# Patient Record
Sex: MALE | Race: WHITE | NOT HISPANIC OR LATINO | URBAN - METROPOLITAN AREA
[De-identification: names, ages, dates, MRNs, and addresses within clinical notes are randomized per-mention and may not be internally consistent; named-entity substitution may affect disease eponyms.]

---
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
## 2023-05-12 ENCOUNTER — EMERGENCY (EMERGENCY)
Facility: HOSPITAL | Age: 23
LOS: 0 days | Discharge: HOME | End: 2023-05-12
Attending: EMERGENCY MEDICINE
Payer: MEDICAID

## 2023-05-12 VITALS
HEIGHT: 69 IN | DIASTOLIC BLOOD PRESSURE: 79 MMHG | RESPIRATION RATE: 17 BRPM | WEIGHT: 182.98 LBS | HEART RATE: 73 BPM | TEMPERATURE: 96 F | OXYGEN SATURATION: 100 % | SYSTOLIC BLOOD PRESSURE: 134 MMHG

## 2023-05-12 DIAGNOSIS — S61.511A LACERATION WITHOUT FOREIGN BODY OF RIGHT WRIST, INITIAL ENCOUNTER: ICD-10-CM
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
## 2023-05-12 DIAGNOSIS — S60.511A ABRASION OF RIGHT HAND, INITIAL ENCOUNTER: ICD-10-CM

## 2023-05-12 PROCEDURE — 99282 EMERGENCY DEPT VISIT SF MDM: CPT

## 2023-05-12 PROCEDURE — 99283 EMERGENCY DEPT VISIT LOW MDM: CPT

## 2023-05-12 NOTE — ED PROVIDER NOTE - NS ED ATTENDING STATEMENT MOD
This was a shared visit with the TINY. I reviewed and verified the documentation and independently performed the documented:

## 2023-05-12 NOTE — ED PROVIDER NOTE - PATIENT PORTAL LINK FT
You can access the FollowMyHealth Patient Portal offered by [facility] by registering at the following website: [email or web address]. By joining [employer]’s FollowMyHealth portal, you will also be able to view your health information using other applications (apps) compatible with our system.
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
## 2023-05-12 NOTE — ED ADULT NURSE NOTE - NSFALLUNIVINTERV_ED_ALL_ED
Bed/Stretcher in lowest position, wheels locked, appropriate side rails in place/Call bell, personal items and telephone in reach/Instruct patient to call for assistance before getting out of bed/chair/stretcher/Non-slip footwear applied when patient is off stretcher/[place] to call system/Physically safe environment - no spills, clutter or unnecessary equipment/Purposeful proactive rounding/Room/bathroom lighting operational, light cord in reach
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
## 2023-05-12 NOTE — ED PROVIDER NOTE - PHYSICAL EXAMINATION
GENERAL: Well-nourished, Well-developed. NAD.  HEAD: No visible or palpable bumps or hematomas. No ecchymosis behind ears B/L.  ENMT: MMM.   Neck: Supple. FROM  CVS: RRR  MSK: FROM R hand, no bony ttp.   Skin: (+)superficial abrasions to 2nd-4th fingers overlying PIP. small superficial glass particles  Neuro: NVI

## 2023-07-30 ENCOUNTER — EMERGENCY (EMERGENCY)
Facility: HOSPITAL | Age: 23
LOS: 0 days | Discharge: HOME | End: 2023-07-30
Attending: STUDENT IN AN ORGANIZED HEALTH CARE EDUCATION/TRAINING PROGRAM
Payer: MEDICAID

## 2023-07-30 VITALS
HEART RATE: 78 BPM | HEIGHT: 69 IN | RESPIRATION RATE: 18 BRPM | WEIGHT: 182.98 LBS | DIASTOLIC BLOOD PRESSURE: 73 MMHG | TEMPERATURE: 99 F | SYSTOLIC BLOOD PRESSURE: 131 MMHG | OXYGEN SATURATION: 99 %

## 2023-07-30 DIAGNOSIS — S61.022A LACERATION WITH FOREIGN BODY OF LEFT THUMB WITHOUT DAMAGE TO NAIL, INITIAL ENCOUNTER: ICD-10-CM

## 2023-07-30 DIAGNOSIS — Z23 ENCOUNTER FOR IMMUNIZATION: ICD-10-CM
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
## 2023-07-30 PROCEDURE — 99284 EMERGENCY DEPT VISIT MOD MDM: CPT | Mod: 25

## 2023-07-30 PROCEDURE — 12041 INTMD RPR N-HF/GENIT 2.5CM/<: CPT

## 2023-07-30 PROCEDURE — 99283 EMERGENCY DEPT VISIT LOW MDM: CPT | Mod: 25

## 2023-07-30 PROCEDURE — 90471 IMMUNIZATION ADMIN: CPT

## 2023-07-30 PROCEDURE — 90715 TDAP VACCINE 7 YRS/> IM: CPT

## 2023-07-30 RX ORDER — TETANUS TOXOID, REDUCED DIPHTHERIA TOXOID AND ACELLULAR PERTUSSIS VACCINE, ADSORBED 5; 2.5; 8; 8; 2.5 [IU]/.5ML; [IU]/.5ML; UG/.5ML; UG/.5ML; UG/.5ML
0.5 SUSPENSION INTRAMUSCULAR ONCE
Refills: 0 | Status: COMPLETED | OUTPATIENT
Start: 2023-07-30 | End: 2023-07-30

## 2023-07-30 RX ADMIN — TETANUS TOXOID, REDUCED DIPHTHERIA TOXOID AND ACELLULAR PERTUSSIS VACCINE, ADSORBED 0.5 MILLILITER(S): 5; 2.5; 8; 8; 2.5 SUSPENSION INTRAMUSCULAR at 21:03

## 2023-07-30 NOTE — ED PROVIDER NOTE - CARE PROVIDER_API CALL
Bri Enciso  Pediatrics  [address] [place] [address]  Phone: [phone]  Fax: [phone]  Follow Up Time:

## 2023-07-30 NOTE — ED PROVIDER NOTE - NSFOLLOWUPINSTRUCTIONS_ED_ALL_ED_FT
Finger Laceration    WHAT YOU NEED TO KNOW:    A finger laceration is a deep cut in your skin. It is often caused by a sharp object, such as a knife, or blunt force to your finger. Your blood vessels, bones, joints, tendons, or nerves may also be injured.     DISCHARGE INSTRUCTIONS:    Return to the emergency department if:     Your wound comes apart.       Blood soaks through your bandage.      You have severe pain in your finger or hand.       Your finger is pale and cold.       You have sudden trouble moving your finger.       Your swelling suddenly gets worse.       You have red streaks on your skin coming from your wound.     Contact your healthcare provider or hand specialist if:     You have new numbness or tingling.       Your finger feels warm, looks swollen or red, and is draining pus.       You have a fever.       You have questions or concerns about your condition or care.     Medicines: You may need any of the following:     Antibiotics help prevent a bacterial infection.       Acetaminophen decreases pain and fever. It is available without a doctor's order. Ask how much to take and how often to take it. Follow directions. Read the labels of all other medicines you are using to see if they also contain acetaminophen, or ask your doctor or pharmacist. Acetaminophen can cause liver damage if not taken correctly. Do not use more than 4 grams (4,000 milligrams) total of acetaminophen in one day.       Prescription pain medicine may be given. Ask your healthcare provider how to take this medicine safely. Some prescription pain medicines contain acetaminophen. Do not take other medicines that contain acetaminophen without talking to your healthcare provider. Too much acetaminophen may cause liver damage. Prescription pain medicine may cause constipation. Ask your healthcare provider how to prevent or treat constipation.       Take your medicine as directed. Contact your healthcare provider if you think your medicine is not helping or if you have side effects. Tell him or her if you are allergic to any medicine. Keep a list of the medicines, vitamins, and herbs you take. Include the amounts, and when and why you take them. Bring the list or the pill bottles to follow-up visits. Carry your medicine list with you in case of an emergency.    Self-care:     Apply ice on your finger for 15 to 20 minutes every hour or as directed. Use an ice pack, or put crushed ice in a plastic bag. Cover it with a towel before you apply it to your skin. Ice helps prevent tissue damage and decreases swelling and pain.      Elevate your hand above the level of your heart as often as you can. This will help decrease swelling and pain. Prop your hand on pillows or blankets to keep it elevated comfortably.       Wear your splint as directed. A splint will decrease movement and stress on your wound. The splint may help your wound heal faster. Ask your healthcare provider how to apply and remove a splint.       Apply ointments to decrease scarring. Do not apply ointments until your healthcare provider says it is okay. You may need to wait until your wound is healed. Ask which ointment to buy and how often to use it.     Wound care:     Do not get your wound wet until your healthcare provider says it is okay. Do not soak your hand in water. Do not go swimming until your healthcare provider says it is okay. When your healthcare provider says it is okay, carefully wash around the wound with soap and water. Let soap and water run over your wound. Gently pat the area dry or allow it to air dry.       Change your bandages when they get wet, dirty, or after washing. Apply new, clean bandages as directed. Do not apply elastic bandages or tape too tightly. Do not put powders or lotions on your wound.       Apply antibiotic ointment as directed. Your healthcare provider may give you antibiotic ointment to put over your wound if you have stitches. If you have Strips-Strips™ over your wound, let them dry up and fall off on their own. If they do not fall off within 14 days, gently remove them. If you have glue over your wound, do not remove or pick at it. If your glue comes off, do not replace it with glue that you have at home.       Check your wound every day for signs of infection. Signs of infection include swelling, redness, or pus.     Follow up with your healthcare provider or hand specialist in 2 days: Write down your questions so you remember to ask them during your visits.        © Copyright [employer] 2019 All illustrations and images included in CareNotes are the copyrighted property of [employer] or [employer].

## 2023-07-30 NOTE — ED PROVIDER NOTE - PHYSICAL EXAMINATION
CONST: Well appearing in NAD  MS: Normal ROM in all extremities. No midline spinal tenderness.  SKIN: 1.5 cm laceration to L thumb pad. L Thumb ROM in tact. No tendon injury. NV intact. Cap refil < 2 sec.   NEURO: A&Ox3, No focal deficits.

## 2023-07-30 NOTE — ED PROVIDER NOTE - CLINICAL SUMMARY MEDICAL DECISION MAKING FREE TEXT BOX
Laceration explored, irrigated and repaired as per procedure note.  Home care and f/u instructions for removal discussed as well as return precautions (increasing pain, redness, pus, swelling, etc)

## 2023-07-30 NOTE — ED PROVIDER NOTE - ATTENDING CONTRIBUTION TO CARE
24 yo m no pmh  pt presents for L thumb pad lac. pt was cutting food and cut tip of L thumb pad. no other injury. unsure tdap.    vss  gen- NAD, aaox3  card-rrr  lungs-ctab, no wheezing or rhonchi  neuro- full str/sensation, cn ii-xii grossly intact, normal coordination and gait  skin- superficial laceration to distal pad ~1.5cm, not crossing joint, FROM and str to DIP/MCP, sensation intact, cap refill <2 sec

## 2023-07-30 NOTE — ED PROVIDER NOTE - PATIENT PORTAL LINK FT
You can access the FollowMyHealth Patient Portal offered by [facility] by registering at the following website: [email or web address]. By joining [facility]’s FollowMyHealth portal, you will also be able to view your health information using other applications (apps) compatible with our system.

## 2023-07-30 NOTE — ED PROVIDER NOTE - OBJECTIVE STATEMENT
22 yo R hand dominant male presents complaining of laceration to L thumb. Patient was cutting frozen burgers, knife slipped and cut him. Hemostasis achieved with direct pressure. Tetanus uptodate. No other injuries

## 2023-07-30 NOTE — ED PROVIDER NOTE - NPI NUMBER (FOR SYSADMIN USE ONLY) :
Ramon Gaspar,   Just wanted to let you know that your wet mount already came back and is showing both BV and yeast this time, which would definitely explain your discomfort! I will go ahead and write you for the metronidazole again for 7 days for BV along with fluconazole for 2 doses for the yeast. Just a heads up again that the metronidazole does not mix well with alcohol so try to avoid when taking. Please let me know if you have any further questions at all, and feel better soon!    Have a nice weekend,     Dr. Funes [[id number]]

## 2023-08-07 ENCOUNTER — EMERGENCY (EMERGENCY)
Facility: HOSPITAL | Age: 23
LOS: 0 days | Discharge: LEFT BEFORE DISCHARGE | End: 2023-08-07
Attending: EMERGENCY MEDICINE
Payer: MEDICAID

## 2023-08-07 VITALS
DIASTOLIC BLOOD PRESSURE: 60 MMHG | SYSTOLIC BLOOD PRESSURE: 126 MMHG | RESPIRATION RATE: 18 BRPM | HEIGHT: 69 IN | OXYGEN SATURATION: 100 % | HEART RATE: 58 BPM | TEMPERATURE: 98 F

## 2023-08-07 PROCEDURE — L9991: CPT

## 2023-08-08 ENCOUNTER — EMERGENCY (EMERGENCY)
Facility: HOSPITAL | Age: 23
LOS: 0 days | Discharge: HOME | End: 2023-08-08
Attending: EMERGENCY MEDICINE
Payer: MEDICAID

## 2023-08-08 VITALS
DIASTOLIC BLOOD PRESSURE: 55 MMHG | RESPIRATION RATE: 18 BRPM | TEMPERATURE: 98 F | OXYGEN SATURATION: 99 % | WEIGHT: 190.04 LBS | SYSTOLIC BLOOD PRESSURE: 122 MMHG | HEART RATE: 84 BPM | HEIGHT: 69 IN

## 2023-08-08 DIAGNOSIS — S61.012D LACERATION WITHOUT FOREIGN BODY OF LEFT THUMB WITHOUT DAMAGE TO NAIL, SUBSEQUENT ENCOUNTER: ICD-10-CM

## 2023-08-08 PROCEDURE — 99212 OFFICE O/P EST SF 10 MIN: CPT

## 2023-08-08 PROCEDURE — L9995: CPT

## 2023-08-08 NOTE — ED PROVIDER NOTE - PHYSICAL EXAMINATION
Vital Signs: I have reviewed the initial vital signs.  Constitutional: well-nourished, no acute distress  Musculoskeletal: good ROM of extremity,  no bony tenderness, no deformity, good peripheral pulses  Integumentary: (+)sutures well healed left thumb - no erythema, drainage or tenderness   Neurologic: awake, alert, extremities’ motor and sensory functions grossly intact, no focal deficits  heme: (-) no adenopathy (-)lymphangitis

## 2023-08-08 NOTE — ED PROVIDER NOTE - OBJECTIVE STATEMENT
24 y/o male presents to the ED for suture removal to left thumb. sutures placed over a week ago. no swelling or redness surrounding wound. no streaking up hand. no drainage from wound.

## 2023-08-08 NOTE — ED PROVIDER NOTE - ATTENDING APP SHARED VISIT CONTRIBUTION OF CARE
23yM presents for removal of sutures to his L thumb.  Wound has healed well w/o bleeding, dehiscence or infection.

## 2023-08-08 NOTE — ED PROVIDER NOTE - PATIENT PORTAL LINK FT
You can access the FollowMyHealth Patient Portal offered by [facility] by registering at the following website: [email or web address]. By joining [facility]’s FollowMyHealth portal, you will also be able to view your health information using other applications (apps) compatible with our system.

## 2023-08-08 NOTE — ED PROVIDER NOTE - CLINICAL SUMMARY MEDICAL DECISION MAKING FREE TEXT BOX
23yM presents for removal of L thumb sutures.  Pt well appearing w/o bleeding, dehiscence or infections.  Recommend supportive care, o/p f/u as needed, return precautions.

## 2023-08-09 DIAGNOSIS — S61.012D LACERATION WITHOUT FOREIGN BODY OF LEFT THUMB WITHOUT DAMAGE TO NAIL, SUBSEQUENT ENCOUNTER: ICD-10-CM

## 2023-08-09 DIAGNOSIS — Z53.21 PROCEDURE AND TREATMENT NOT CARRIED OUT DUE TO PATIENT LEAVING PRIOR TO BEING SEEN BY HEALTH CARE PROVIDER: ICD-10-CM
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
## 2023-08-09 DIAGNOSIS — Z48.02 ENCOUNTER FOR REMOVAL OF SUTURES: ICD-10-CM

## 2023-11-17 NOTE — ED ADULT NURSE NOTE - SUICIDE SCREENING QUESTION 2
normal appearance , without tenderness upon palpation , no deformities , trachea midline, no masses , no JVD.
No

## 2023-12-06 NOTE — ED PEDIATRIC NURSE NOTE - OBJECTIVE STATEMENT
I reviewed the H&P, I examined the patient, and there are no changes in the patient's condition.     pt was in the sun today and fell asleep. now with sunburn & headache

## 2024-08-21 NOTE — ED ADULT TRIAGE NOTE - AS TEMP SITE
Anesthesia Transfer of Care Note    Patient: Yenifer Chatterjee    Procedure(s) Performed: Procedure(s) (LRB):  EGD (ESOPHAGOGASTRODUODENOSCOPY) (N/A)    Patient location: PACU    Anesthesia Type: general    Transport from OR: Transported from OR on room air with adequate spontaneous ventilation    Post pain: adequate analgesia    Post assessment: no apparent anesthetic complications and tolerated procedure well    Post vital signs: stable    Level of consciousness: sedated and responds to stimulation    Nausea/Vomiting: no nausea/vomiting    Complications: none    Transfer of care protocol was followed      Last vitals: Visit Vitals  /76 (BP Location: Left arm, Patient Position: Lying)   Pulse 65   Temp 36.9 °C (98.4 °F) (Skin)   Resp 16   Wt 93.4 kg (206 lb)   SpO2 99%   Breastfeeding No   BMI 35.36 kg/m²     
oral

## 2024-11-18 ENCOUNTER — EMERGENCY (EMERGENCY)
Facility: HOSPITAL | Age: 24
LOS: 0 days | Discharge: HOME | End: 2024-11-18
Attending: EMERGENCY MEDICINE
Payer: COMMERCIAL

## 2024-11-18 VITALS
OXYGEN SATURATION: 99 % | SYSTOLIC BLOOD PRESSURE: 121 MMHG | WEIGHT: 179.9 LBS | HEART RATE: 77 BPM | TEMPERATURE: 98 F | DIASTOLIC BLOOD PRESSURE: 74 MMHG | RESPIRATION RATE: 20 BRPM

## 2024-11-18 DIAGNOSIS — S60.413A ABRASION OF LEFT MIDDLE FINGER, INITIAL ENCOUNTER: ICD-10-CM
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
## 2024-11-18 DIAGNOSIS — S60.417A ABRASION OF LEFT LITTLE FINGER, INITIAL ENCOUNTER: ICD-10-CM

## 2024-11-18 DIAGNOSIS — Z23 ENCOUNTER FOR IMMUNIZATION: ICD-10-CM
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
## 2024-11-18 PROCEDURE — 90471 IMMUNIZATION ADMIN: CPT

## 2024-11-18 PROCEDURE — 90715 TDAP VACCINE 7 YRS/> IM: CPT

## 2024-11-18 PROCEDURE — 99283 EMERGENCY DEPT VISIT LOW MDM: CPT | Mod: 25

## 2024-11-18 PROCEDURE — 99284 EMERGENCY DEPT VISIT MOD MDM: CPT

## 2024-11-18 RX ORDER — AMOXICILLIN AND CLAVULANATE POTASSIUM 600; 42.9 MG/5ML; MG/5ML
1 POWDER, FOR SUSPENSION ORAL ONCE
Refills: 0 | Status: COMPLETED | OUTPATIENT
Start: 2024-11-18 | End: 2024-11-18

## 2024-11-18 RX ORDER — CLOSTRIDIUM TETANI TOXOID ANTIGEN (FORMALDEHYDE INACTIVATED), CORYNEBACTERIUM DIPHTHERIAE TOXOID ANTIGEN (FORMALDEHYDE INACTIVATED), BORDETELLA PERTUSSIS TOXOID ANTIGEN (GLUTARALDEHYDE INACTIVATED), BORDETELLA PERTUSSIS FILAMENTOUS HEMAGGLUTININ ANTIGEN (FORMALDEHYDE INACTIVATED), BORDETELLA PERTUSSIS PERTACTIN ANTIGEN, AND BORDETELLA PERTUSSIS FIMBRIAE 2/3 ANTIGEN 5; 2; 2.5; 5; 3; 5 [LF]/.5ML; [LF]/.5ML; UG/.5ML; UG/.5ML; UG/.5ML; UG/.5ML
0.5 INJECTION, SUSPENSION INTRAMUSCULAR ONCE
Refills: 0 | Status: COMPLETED | OUTPATIENT
Start: 2024-11-18 | End: 2024-11-18

## 2024-11-18 RX ORDER — AMOXICILLIN AND CLAVULANATE POTASSIUM 600; 42.9 MG/5ML; MG/5ML
1 POWDER, FOR SUSPENSION ORAL
Qty: 14 | Refills: 0
Start: 2024-11-18 | End: 2024-11-24

## 2024-11-18 RX ADMIN — AMOXICILLIN AND CLAVULANATE POTASSIUM 1 TABLET(S): 600; 42.9 POWDER, FOR SUSPENSION ORAL at 12:35

## 2024-11-18 RX ADMIN — CLOSTRIDIUM TETANI TOXOID ANTIGEN (FORMALDEHYDE INACTIVATED), CORYNEBACTERIUM DIPHTHERIAE TOXOID ANTIGEN (FORMALDEHYDE INACTIVATED), BORDETELLA PERTUSSIS TOXOID ANTIGEN (GLUTARALDEHYDE INACTIVATED), BORDETELLA PERTUSSIS FILAMENTOUS HEMAGGLUTININ ANTIGEN (FORMALDEHYDE INACTIVATED), BORDETELLA PERTUSSIS PERTACTIN ANTIGEN, AND BORDETELLA PERTUSSIS FIMBRIAE 2/3 ANTIGEN 0.5 MILLILITER(S): 5; 2; 2.5; 5; 3; 5 INJECTION, SUSPENSION INTRAMUSCULAR at 12:35

## 2024-11-18 NOTE — ED PROVIDER NOTE - CARE PLAN
Inpatient Rehabilitation - Speech Language Pathology Treatment Note    [facility]     Patient Name: Louise GARCIA  : 1962  MRN: [id number]    Today's Date: 2023                   Admit Date: 3/17/2023       Visit Dx:      ICD-10-CM ICD-9-CM   1. Impaired functional mobility, balance, gait, and endurance  Z74.09 V49.89       Patient Active Problem List   Diagnosis   • Sepsis   • Neck pain, chronic   • Upper back pain   • Paresthesia   • Cervical myelopathy   • Lower extremity weakness   • History of blood clots   • Chronic anticoagulation   • Seizures   • Anemia   • GERD (gastroesophageal reflux disease)   • Guillain-[place] syndrome   • Situational mixed anxiety and depressive disorder   • Mass of middle lobe of right lung   • Oral candidiasis   • Empyema of pleura   • MRSA bacteremia   • Idiopathic peripheral neuropathy       Past Medical History:   Diagnosis Date   • Degenerative disc disease, cervical    • Gestational diabetes    • H/O blood clots    • Hematemesis    • Seizures    • Septic shock        Past Surgical History:   Procedure Laterality Date   • APPENDECTOMY     • BACK SURGERY     • CHOLECYSTECTOMY     • ENDOSCOPY N/A 2016    Procedure: ESOPHAGOGASTRODUODENOSCOPY with biopsy;  Surgeon: Austen Brito MD;  Location: [facility] ENDOSCOPY;  Service:    • HYSTERECTOMY     • NECK SURGERY       approx 6 yrs ago   • THORACOSCOPY Right 3/8/2023    Procedure: THORACOSCOPY WITH DAVINCI ROBOT WITH COMPLETE DECORTICATION;  Surgeon: Chloe Cedillo MD;  Location: [facility] OR;  Service: Robotics - DaVinci;  Laterality: Right;   • TONSILLECTOMY     • TONSILLECTOMY AND ADENOIDECTOMY         SLP Recommendation and Plan                                                            SLP EVALUATION (last 72 hours)     SLP SLC Evaluation     Row Name 23 0930 23 1500 23 0930 04/15/23 1230          Communication Assessment/Intervention    Document Type therapy note (daily note)  -AL  "therapy note (daily note)  -AL therapy note (daily note)  -AL therapy note (daily note)  -HF     Subjective Information -- -- -- no complaints  -HF     Patient Observations -- -- -- alert;cooperative;agree to therapy  -HF     Patient/Family/Caregiver Comments/Observations Pt participated well. She reported her  was upset with her last evening, which kept her from sleeping. She reported \"He was yelling and cussing.\"  -AL Pt participated well.  -AL Pt is pleasant and cooperative.  -AL --     Patient Effort -- -- -- good  -HF     Symptoms Noted During/After Treatment -- -- -- none  -HF        Pain Scale: Numbers Pre/Post-Treatment    Pretreatment Pain Rating 0/10 - no pain  -AL 0/10 - no pain  -AL 0/10 - no pain  -AL --        Phonation Goal 1 (SLP)    Comment (Phonation Goal 1, SLP) -- -- -- Introduced to straw phonation exercises. She required initial MAX cues to executive, faded to MIN-MOD over course of session. Encouraged continued completion as part of home exercises to promote forward resonance.  -HF        Memory Skills Goal 1 (SLP)    Comment (Memory Skills Goal 1, SLP) -- -- -- The patient was 89% accurate for 1-back, but unable to complete 2-back due to difficulty with attention/memory.  -HF           User Key  (r) = Recorded By, (t) = Taken By, (c) = Cosigned By    Initials Name Effective Dates    FLORENTINO Spangler Nessa Nguyen, MS CCC-SLP 06/16/21 -     HF Kacey Song CCC-SLP 12/27/22 -                    EDUCATION    The patient has been educated in the following areas:       Cognitive Impairment Communication Impairment.             SLP GOALS     Row Name 04/18/23 1200 04/17/23 1500 04/17/23 0930       Respiratory Support Goal 1 (SLP)    Comment (Respiratory Support Goal 1, SLP) -- Pt required overall MOD cues for diaphragmatic breathing during voice exercises.  -AL --       Phonation Goal 1 (SLP)    Improve Phonation By Goal 1 (SLP) -- using appropriate tone focus;90%;with minimal cues (75-90%)  -AL --    " Time Frame (Phonation Goal 1, SLP) -- short term goal (STG)  -AL --    Progress/Outcomes (Phonation Goal 1, SLP) goal ongoing  -AL good progress toward goal  -AL --    Comment (Phonation Goal 1, SLP) Pt presented with mild-moderate hoarse vocal quality today. She participated in frontal tone focus exercises (humming, initial /m/ CV productions and single syllable initial /m/ words). Pt exhibited vocal fatigue during single syllable productions and had difficulty  maintaining adequate vocal quality. Pt participated in 10 minute conversation with periods of intermittent clear vocal quality.  -AL Pt presented with mild-moderate hoarse vocal quality at beginning of session. Participated in humming, initial /m/ CV chanting and initial /m/ single syllable word production with overall MIN-MOD cues to achieve adequate vocal quality. Pt read a multiple paragraph story with initial MOD cues for frontal tone focus; however, as task progressed, pt required NO cues for maintaining adequate vocal quality. Pt required MIN cues in conversation at end of session to maintain adequate vocal quality. Also, benefited from periodic yawn/sign to assist with reducing hyperfunction.  -AL --       Attention Goal 1 (SLP)    Improve Attention by Goal 1 (SLP) -- -- complete sustained attention task;80%;with minimal cues (75-90%)  -AL    Time Frame (Attention Goal 1, SLP) -- -- 1 week  -AL    Progress/Outcomes (Attention Goal 1, SLP) -- -- continuing progress toward goal  -AL    Comment (Attention Goal 1, SLP) -- -- Acrostics task: 7/12 with NO cues, 12/ with MIN cues  -AL       Memory Skills Goal 1 (SLP)    Improve Memory Skills Through Goal 1 (SLP) -- -- use memory strategies;use external memory aid;recall details of the day;80%;with moderate cues (50-74%)  -AL    Time Frame (Memory Skills Goal 1, SLP) -- -- 1 week  -AL    Progress/Outcomes (Memory Skills Goal 1, SLP) -- -- continuing progress toward goal  -AL    Comment (Memory Skills Goal 1,  SLP) -- -- Recalled 3/3 errands after 15 minutes with NO Cues.  -AL       Organizational Skills Goal 1 (SLP)    Improve Thought Organization Through Goal 1 (SLP) -- -- completing a divergent naming task;80%;with minimal cues (75-90%)  -AL    Time Frame (Thought Organization Skills Goal 1, SLP) -- -- 1 week  -AL    Progress/Outcomes (Thought Organization Skills Goal 1, SLP) -- -- goal ongoing  -AL    Comment (Thought Organization Skills Goal 1, SLP) -- -- Round: 11 with NO cues. Tall: 6 with NO cues  -AL    Row Name 04/15/23 1230             Phonation Goal 1 (SLP)    Comment (Phonation Goal 1, SLP) Introduced to straw phonation exercises. She required initial MAX cues to executive, faded to MIN-MOD over course of session. Encouraged continued completion as part of home exercises to promote forward resonance.  -HF         Memory Skills Goal 1 (SLP)    Comment (Memory Skills Goal 1, SLP) The patient was 89% accurate for 1-back, but unable to complete 2-back due to difficulty with attention/memory.  -HF            User Key  (r) = Recorded By, (t) = Taken By, (c) = Cosigned By    Initials Name Provider Type    Nessa Kraus MS CCC-SLP Speech and Language Pathologist    HF Kacey Song CCC-SLP Speech and Language Pathologist                            Time Calculation:        Time Calculation- SLP     Row Name 04/18/23 1228             Time Calculation- SLP    SLP Start Time 0930  -AL      SLP Stop Time 1000  -AL      SLP Time Calculation (min) 30 min  -AL            User Key  (r) = Recorded By, (t) = Taken By, (c) = Cosigned By    Initials Name Provider Type    Nessa Kraus MS CCC-SLP Speech and Language Pathologist                  Therapy Charges for Today     Code Description Service Date Service Provider Modifiers Qty    56243667878 HC ST DEV OF COGN SKILLS INITIAL 15 MIN 4/17/2023 Nessa Spangler MS CCC-SLP  1    67092369481 HC ST DEV OF COGN SKILLS EACH ADDT'L 15 MIN 4/17/2023 Nessa Spangler  MS CCC-SLP  1    17086549285 HC ST DEV OF COGN SKILLS EACH ADDT'L 15 MIN 4/17/2023 Nessa Spangler, MS CCC-SLP  2    97037119300 HC ST DEV OF COGN SKILLS INITIAL 15 MIN 4/18/2023 Nessa Spangler, MS CCC-SLP  1    36525510148 HC ST DEV OF COGN SKILLS EACH ADDT'L 15 MIN 4/18/2023 Nessa Spangler, MS CCC-SLP  1                           Nessa Spangler, MS CCC-SLP  4/18/2023     Principal Discharge DX:	Dog bite   1

## 2024-11-18 NOTE — ED PROVIDER NOTE - CLINICAL SUMMARY MEDICAL DECISION MAKING FREE TEXT BOX
pt presenting for eval of dog bite to r hand. no other injuries or acute complaints. bitten by his own dog, vaccines utd. no numbness/focal weakness. 2+ equal pulses throughout <2sec capillary refill throughout abrasions to R hand. no erythema, edema, warmth, crepitus, induration, fluctuance, streaking. Comfortable with discharge and follow-up outpatient, strict return precautions given. Endorses understanding of all of this and aware that they can return at any time for new or concerning symptoms. No further questions or concerns at this time

## 2024-11-18 NOTE — ED PROVIDER NOTE - PATIENT PORTAL LINK FT
You can access the FollowMyHealth Patient Portal offered by [facility] by registering at the following website: [email or web address]. By joining [facility]’s FollowMyHealth portal, you will also be able to view your health information using other applications (apps) compatible with our system.

## 2024-11-18 NOTE — ED ADULT TRIAGE NOTE - CHIEF COMPLAINT QUOTE
dog bite to the right hand yesterday . pt. states another dog was attacking his dog and he tried to stop it

## 2024-11-18 NOTE — ED PROVIDER NOTE - OBJECTIVE STATEMENT
24-year-old male with no past medical history presents to the ED status post walking dog at park yesterday and another dog began fighting.  Patient states put his hands in his dog's mouth to release the grasp and got bit to his right hand fourth and fifth digit.  Patient states his dog is up-to-date with shots.  Unknown last tetanus shot.

## 2024-12-09 ENCOUNTER — EMERGENCY (EMERGENCY)
Facility: HOSPITAL | Age: 24
LOS: 0 days | Discharge: HOME | End: 2024-12-09
Attending: EMERGENCY MEDICINE
Payer: COMMERCIAL

## 2024-12-09 VITALS
DIASTOLIC BLOOD PRESSURE: 79 MMHG | OXYGEN SATURATION: 99 % | WEIGHT: 179.9 LBS | HEART RATE: 80 BPM | RESPIRATION RATE: 20 BRPM | HEIGHT: 69 IN | TEMPERATURE: 98 F | SYSTOLIC BLOOD PRESSURE: 146 MMHG
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
## 2024-12-09 DIAGNOSIS — S61.412A LACERATION WITHOUT FOREIGN BODY OF LEFT HAND, INITIAL ENCOUNTER: ICD-10-CM
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
## 2024-12-09 PROCEDURE — 12001 RPR S/N/AX/GEN/TRNK 2.5CM/<: CPT

## 2024-12-09 PROCEDURE — 99284 EMERGENCY DEPT VISIT MOD MDM: CPT | Mod: 25

## 2024-12-09 PROCEDURE — 99283 EMERGENCY DEPT VISIT LOW MDM: CPT | Mod: 25

## 2024-12-09 RX ORDER — CEPHALEXIN 500 MG
500 CAPSULE ORAL ONCE
Refills: 0 | Status: COMPLETED | OUTPATIENT
Start: 2024-12-09 | End: 2024-12-09

## 2024-12-09 RX ORDER — LIDOCAINE HCL 20 MG/ML
10 VIAL (ML) INJECTION ONCE
Refills: 0 | Status: COMPLETED | OUTPATIENT
Start: 2024-12-09 | End: 2024-12-09

## 2024-12-09 RX ORDER — CEPHALEXIN 500 MG
1 CAPSULE ORAL
Qty: 20 | Refills: 0
Start: 2024-12-09 | End: 2024-12-13

## 2024-12-09 RX ADMIN — Medication 10 MILLILITER(S): at 22:48

## 2024-12-09 RX ADMIN — Medication 500 MILLIGRAM(S): at 22:57

## 2024-12-09 NOTE — ED PROVIDER NOTE - PATIENT PORTAL LINK FT
You can access the FollowMyHealth Patient Portal offered by [facility] by registering at the following website: [email or web address]. By joining [employer]’s FollowMyHealth portal, you will also be able to view your health information using other applications (apps) compatible with our system.

## 2024-12-09 NOTE — ED PROVIDER NOTE - NSFOLLOWUPINSTRUCTIONS_ED_ALL_ED_FT
Follow up with your primary care doctor in 1-2 days     suture removal in 2 weeks, please wear splint for laceration support    Laceration    WHAT YOU NEED TO KNOW:    A laceration is an injury to the skin and the soft tissue underneath it. Lacerations happen when you are cut or hit by something. They can happen anywhere on the body.     DISCHARGE INSTRUCTIONS:    Return to the emergency department if:     You have heavy bleeding or bleeding that does not stop after 10 minutes of holding firm, direct pressure over the wound.       Your wound opens up.     Contact your healthcare provider if:     You have a fever or chills.       Your laceration is red, warm, or swollen.      You have red streaks on your skin coming from your wound.      You have white or yellow drainage from the wound that smells bad.      You have pain that gets worse, even after treatment.       You have questions or concerns about your condition or care.     Medicines:     Prescription pain medicine may be given. Ask how to take this medicine safely.       Antibiotics help treat or prevent a bacterial infection.       Take your medicine as directed. Contact your healthcare provider if you think your medicine is not helping or if you have side effects. Tell him or her if you are allergic to any medicine. Keep a list of the medicines, vitamins, and herbs you take. Include the amounts, and when and why you take them. Bring the list or the pill bottles to follow-up visits. Carry your medicine list with you in case of an emergency.    Care for your wound as directed:     Do not get your wound wet until your healthcare provider says it is okay. Do not soak your wound in water. Do not go swimming until your healthcare provider says it is okay. Carefully wash the wound with soap and water. Gently pat the area dry or allow it to air dry.       Change your bandages when they get wet, dirty, or after washing. Apply new, clean bandages as directed. Do not apply elastic bandages or tape too tight. Do not put powders or lotions over your incision.       Apply antibiotic ointment as directed. Your healthcare provider may give you antibiotic ointment to put over your wound if you have stitches. If you have strips of tape over your incision, let them dry up and fall off on their own. If they do not fall off within 14 days, gently remove them. If you have glue over your wound, do not remove or pick at it. If your glue comes off, do not replace it with glue that you have at home.       Check your wound every day for signs of infection such as swelling, redness, or pus.     Self-care:     Apply ice on your wound for 15 to 20 minutes every hour or as directed. Use an ice pack, or put crushed ice in a plastic bag. Cover it with a towel. Ice helps prevent tissue damage and decreases swelling and pain.      Use a splint as directed. A splint will decrease movement and stress on your wound. It may help it heal faster. A splint may be used for lacerations over joints or areas of your body that bend. Ask your healthcare provider how to apply and remove a splint.       Decrease scarring of your wound by applying ointments as directed. Do not apply ointments until your healthcare provider says it is okay. You may need to wait until your wound is healed. Ask which ointment to buy and how often to use it. After your wound is healed, use sunscreen over the area when you are out in the sun. You should do this for at least 6 months to 1 year after your injury.     Follow up with your healthcare provider as directed: You may need to follow up in 24 to 48 hours to have your wound checked for infection. You will need to return in 3 to 14 days if you have stitches or staples so they can be removed. Care for your wound as directed to prevent infection and help it heal. Write down your questions so you remember to ask them during your visits.       © Copyright [employer] 2019 All illustrations and images included in CareNotes are the copyrighted property of [employer] or [employer].

## 2024-12-09 NOTE — ED PROVIDER NOTE - ATTENDING APP SHARED VISIT CONTRIBUTION OF CARE
I have personally performed a history and physical exam on this patient and personally directed the management of the patient. Patient is a 24-year-old male presents for evaluation of laceration patient was using a /sheet  and sustained a laceration in between the interdigital space of the first and second finger onset several hours prior to arrival bleeding controlled patient has no weakness no signs of infection capillary refill normal radial pulse 2+    On physical exam patient has laceration noted to the interdigital space between first and second finger no weakness no evidence of tendon injury no evidence of neurovascular compromise radial pulses 2+ capillary refills normal level with he successfully underwent laceration repair with discharge

## 2024-12-09 NOTE — ED PROVIDER NOTE - OBJECTIVE STATEMENT
Patient is a 24-year-old male presents for evaluation of laceration patient was using a /sheet  and sustained a laceration in between the interdigital space of the first and second finger onset several hours prior to arrival bleeding controlled patient has no weakness no signs of infection capillary refill normal radial pulse 2+

## 2024-12-09 NOTE — ED PROVIDER NOTE - PHYSICAL EXAMINATION
On physical exam patient has laceration noted to the interdigital space between first and second finger no weakness no evidence of tendon injury no evidence of neurovascular compromise radial pulses 2+ capillary refills normal level with he successfully underwent laceration repair with discharge

## 2024-12-09 NOTE — ED PROVIDER NOTE - CLINICAL SUMMARY MEDICAL DECISION MAKING FREE TEXT BOX
. Patient is a 24-year-old male presents for evaluation of laceration patient was using a /sheet  and sustained a laceration in between the interdigital space of the first and second finger onset several hours prior to arrival bleeding controlled patient has no weakness no signs of infection capillary refill normal radial pulse 2+    On physical exam patient has laceration noted to the interdigital space between first and second finger no weakness no evidence of tendon injury no evidence of neurovascular compromise radial pulses 2+ capillary refills normal level with he successfully underwent laceration repair with discharge

## 2024-12-10 NOTE — ED PROCEDURE NOTE - PROCEDURE ADDITIONAL DETAILS
Strenght 5/5 no tendon injury seen bloodless field. patient placed in thumb spica for laceration support

## 2024-12-10 NOTE — ED PROCEDURE NOTE - ATTENDING APP SHARED VISIT CONTRIBUTION OF CARE
(4) no impairment
I have personally performed a history and physical exam on this patient and personally directed the management of the patient.

## 2024-12-23 ENCOUNTER — EMERGENCY (EMERGENCY)
Facility: HOSPITAL | Age: 24
LOS: 0 days | Discharge: HOME | End: 2024-12-23
Attending: EMERGENCY MEDICINE
Payer: COMMERCIAL

## 2024-12-23 VITALS
WEIGHT: 179.9 LBS | DIASTOLIC BLOOD PRESSURE: 84 MMHG | SYSTOLIC BLOOD PRESSURE: 129 MMHG | HEART RATE: 80 BPM | TEMPERATURE: 98 F | HEIGHT: 69 IN | OXYGEN SATURATION: 99 % | RESPIRATION RATE: 18 BRPM

## 2024-12-23 DIAGNOSIS — Z48.02 ENCOUNTER FOR REMOVAL OF SUTURES: ICD-10-CM

## 2024-12-23 DIAGNOSIS — S61.411D LACERATION WITHOUT FOREIGN BODY OF RIGHT HAND, SUBSEQUENT ENCOUNTER: ICD-10-CM

## 2024-12-23 PROCEDURE — 99212 OFFICE O/P EST SF 10 MIN: CPT

## 2024-12-23 PROCEDURE — L9995: CPT

## 2024-12-23 NOTE — ED PROVIDER NOTE - CLINICAL SUMMARY MEDICAL DECISION MAKING FREE TEXT BOX
24yM presents for suture  removal -  well healed  no dehiscence in infection.  pt dcd well appearing condition

## 2024-12-23 NOTE — ED PROVIDER NOTE - ED STEMI HIDDEN
Preceptor Attestation:   I discussed the patient with the resident. I was present for and supervised the entire procedure. I have verified the content of the note, which accurately reflects my assessment of the patient and the plan of care.   Supervising Physician:  Mitul Trimble MD.                    hide

## 2024-12-23 NOTE — ED PROVIDER NOTE - NSFOLLOWUPINSTRUCTIONS_ED_ALL_ED_FT
Follow up with your primary care doctor in 1-2 days     Stitches Removal    WHAT YOU NEED TO KNOW:    Stitches are usually removed within 14 days, depending on the location of the wound. Your healthcare provider will tell you when to return to have your stitches removed. Your provider will use sterile forceps or tweezers to  the knot of each stitch. He or she will cut the stitch with scissors and pull the stitch out. You may feel a slight tug as the stitch comes out.    DISCHARGE INSTRUCTIONS:    Return to the emergency department if:     Your wound splits open or is starting to come apart.      You suddenly cannot move your injured joint.      You have sudden numbness around your wound.      You see red streaks coming from your wound.    Contact your healthcare provider if:     You have a fever and chills.      Your wound is red, warm, swollen, or leaking pus.      There is a bad smell coming from your wound.      You have increased pain in the wound area.      You have questions or concerns about your condition or care.    Care for the area after the stitches are removed:     Do not pull medical tape off. Your provider may place small strips of medical tape across your wound after the stitches have been removed. These strips will peel and fall of on their own. Do not pull them off.      Clean the area as directed. Carefully wash the area with soap and water. Pat the area dry with a clean towel. Check the area for signs of infection, such as redness, swelling, or pus. Also check that the wound is not coming apart.      Protect your wound. Your wound can swell, bleed, or split open if it is stretched or bumped. You may need to wear a bandage that supports your wound until it is completely healed.      Care for a scar. You may have a scar after the stitches are removed. Use sunblock if the area is exposed to the sun. Apply it every day after the stitches are removed. This will help prevent skin discoloration. Talk to your healthcare provider about medicines you can use to make the scar less visible. Some medicines are available without a prescription.    Follow up with your healthcare provider as directed: You may need to return in 3 to 5 days if the stitches are on your face. Stitches on your scalp need to be removed after 7 to 14 days. Stitches over joints may remain in place up to 14 days. Write down your questions so you remember to ask them during your visits.        © Copyright [employer] 2019 All illustrations and images included in CareNotes are the copyrighted property of [employer] or [employer].

## 2024-12-23 NOTE — ED PROVIDER NOTE - PATIENT PORTAL LINK FT
You can access the FollowMyHealth Patient Portal offered by [facility] by registering at the following website: [email or web address]. By joining [email or web address]’s FollowMyHealth portal, you will also be able to view your health information using other applications (apps) compatible with our system.

## 2024-12-23 NOTE — ED PROVIDER NOTE - OBJECTIVE STATEMENT
24 year old male here today for suture removal. patient had them placed 2 weeks ago. patient with no complaints. denies infection and fever/chills.

## 2024-12-23 NOTE — ED PROVIDER NOTE - PHYSICAL EXAMINATION
Physical Exam    Vital Signs: I have reviewed the initial vital signs.  Constitutional: well-nourished, appears stated age, no acute distress  Musculoskeletal: supple neck, no lower extremity edema, no midline tenderness  Integumentary: warm, dry, 3 suture in place well healed no signs of infection  Neurologic: awake, alert,  extremities’ motor and sensory functions grossly intact  Psychiatric: appropriate mood, appropriate affect
